# Patient Record
Sex: MALE | Race: WHITE | Employment: STUDENT | URBAN - NONMETROPOLITAN AREA
[De-identification: names, ages, dates, MRNs, and addresses within clinical notes are randomized per-mention and may not be internally consistent; named-entity substitution may affect disease eponyms.]

---

## 2020-12-17 ENCOUNTER — NURSE ONLY (OUTPATIENT)
Dept: FAMILY MEDICINE CLINIC | Age: 25
End: 2020-12-17
Payer: COMMERCIAL

## 2020-12-17 ENCOUNTER — TELEPHONE (OUTPATIENT)
Dept: FAMILY MEDICINE CLINIC | Age: 25
End: 2020-12-17

## 2020-12-17 LAB
BILIRUBIN URINE: ABNORMAL
BLOOD URINE, POC: ABNORMAL
CHARACTER, URINE: ABNORMAL
COLOR, URINE: ABNORMAL
GLUCOSE URINE: NEGATIVE MG/DL
KETONES, URINE: 15
LEUKOCYTE CLUMPS, URINE: ABNORMAL
NITRITE, URINE: POSITIVE
PH, URINE: 6 (ref 5–9)
PROTEIN, URINE: >= 300 MG/DL
SPECIFIC GRAVITY, URINE: 1.02 (ref 1–1.03)
UROBILINOGEN, URINE: 1 EU/DL (ref 0–1)

## 2020-12-17 PROCEDURE — 99211 OFF/OP EST MAY X REQ PHY/QHP: CPT | Performed by: FAMILY MEDICINE

## 2020-12-17 PROCEDURE — 81003 URINALYSIS AUTO W/O SCOPE: CPT | Performed by: FAMILY MEDICINE

## 2020-12-17 RX ORDER — CIPROFLOXACIN 500 MG/1
500 TABLET, FILM COATED ORAL 2 TIMES DAILY
Qty: 20 TABLET | Refills: 0 | Status: SHIPPED | OUTPATIENT
Start: 2020-12-17 | End: 2020-12-27

## 2020-12-17 NOTE — PROGRESS NOTES
Mom dropped off urine d/t pt having the following symptoms:    - urinary urgency  - dysuria  - hematuria    Mom states that pt has been having symptoms since noon today.      [facility]    Pharmacy- [facility]        URINE HAS BEEN CULTURED!!!

## 2020-12-17 NOTE — PROGRESS NOTES
Chief Complaint   Patient presents with    Dysuria    Hematuria       Results for POC orders placed in visit on 12/17/20   POCT Urinalysis No Micro (Auto)   Result Value Ref Range    Glucose, Ur Negative NEGATIVE mg/dl    Bilirubin Urine Moderate (A)     Ketones, Urine 15 (A) NEGATIVE    Specific Gravity, Urine 1.025 1.002 - 1.030    Blood, UA POC Large (A) NEGATIVE    pH, Urine 6.00 5.0 - 9.0    Protein, Urine >= 300 (A) NEGATIVE mg/dl    Urobilinogen, Urine 1.00 0.0 - 1.0 eu/dl    Nitrite, Urine Positive (A) NEGATIVE    Leukocyte Clumps, Urine Large (A) NEGATIVE    Color, Urine Red (A) [id number]    Character, Urine Cloudy (A) [email or web address]     UTI  Start antibiotic  Push water and cranberry juice  Recheck urine in 2 weeks  Call patient

## 2020-12-17 NOTE — TELEPHONE ENCOUNTER
Mom called office stating that pt has blood in his urine and that he is having urgency and dysuria. Symptoms started around noon today. Mom will be dropping off sample this afternoon to have tested.      ONEYDA

## 2020-12-17 NOTE — PROGRESS NOTES
Called and spoke with mom regarding pt's results, mom stated that pt is not having any back pain, fever, any concerning symptoms at this time. Pt does not have a hx of kidney stones but mom stated that pt is having pain thru his bladder.

## 2020-12-19 LAB
ORGANISM: ABNORMAL
URINE CULTURE, ROUTINE: ABNORMAL